# Patient Record
Sex: FEMALE | URBAN - METROPOLITAN AREA
[De-identification: names, ages, dates, MRNs, and addresses within clinical notes are randomized per-mention and may not be internally consistent; named-entity substitution may affect disease eponyms.]

---

## 2023-03-30 ENCOUNTER — OFFICE VISIT (OUTPATIENT)
Dept: URBAN - METROPOLITAN AREA CLINIC 56 | Facility: LOCATION | Age: 53
End: 2023-03-30
Payer: COMMERCIAL

## 2023-03-30 DIAGNOSIS — H10.012 ACUTE FOLLICULAR CONJUNCTIVITIS, LEFT EYE: Primary | ICD-10-CM

## 2023-03-30 PROCEDURE — 99204 OFFICE O/P NEW MOD 45 MIN: CPT | Performed by: OPTOMETRIST

## 2023-03-30 RX ORDER — NEOMYCIN SULFATE, POLYMYXIN B SULFATE AND DEXAMETHASONE 3.5; 10000; 1 MG/ML; [USP'U]/ML; MG/ML
SUSPENSION OPHTHALMIC
Qty: 5 | Refills: 0 | Status: ACTIVE
Start: 2023-03-30

## 2023-03-30 ASSESSMENT — KERATOMETRY
OD: 41.67
OS: 38.84

## 2023-03-30 ASSESSMENT — VISUAL ACUITY
OS: 20/25
OD: 20/20

## 2023-03-30 ASSESSMENT — INTRAOCULAR PRESSURE
OS: 13
OD: 14

## 2023-03-30 NOTE — IMPRESSION/PLAN
Impression: Acute follicular conjunctivitis, left eye: H10.012. Plan: Discussed with patient, contagious nature. Start Maxitrol QID OS only. RTC 1 week or sooner if problem worsens.

## 2023-04-05 ENCOUNTER — OFFICE VISIT (OUTPATIENT)
Dept: URBAN - METROPOLITAN AREA CLINIC 56 | Facility: LOCATION | Age: 53
End: 2023-04-05
Payer: COMMERCIAL

## 2023-04-05 DIAGNOSIS — H10.013 ACUTE BILATERAL FOLLICULAR CONJUNCTIVITIS: Primary | ICD-10-CM

## 2023-04-05 PROCEDURE — 99213 OFFICE O/P EST LOW 20 MIN: CPT | Performed by: OPTOMETRIST

## 2023-04-05 RX ORDER — NEOMYCIN SULFATE, POLYMYXIN B SULFATE AND DEXAMETHASONE 3.5; 10000; 1 MG/ML; [USP'U]/ML; MG/ML
SUSPENSION OPHTHALMIC
Qty: 5 | Refills: 1 | Status: ACTIVE
Start: 2023-04-05

## 2023-04-05 ASSESSMENT — INTRAOCULAR PRESSURE
OS: 13
OD: 13

## 2023-04-05 NOTE — IMPRESSION/PLAN
Impression: Acute bilateral follicular conjunctivitis: H10.013. Plan: Spread to OD. OU improving but not resolved. Use Maxitrol TID OU for 1 week then stop. RTC if problem persists or worsens.